# Patient Record
(demographics unavailable — no encounter records)

---

## 2025-03-17 NOTE — IMAGING
[de-identified] : On examination of his right shoulder he has no erythema, no swelling, no ecchymosis.  He has some mild tenderness over the AC joint and the anterior glenohumeral joint.  No tenderness over the clavicle.  He has full range of motion of the shoulder.  Negative speeds, negative drop arm mild pain with McDonough's.  Pain with apprehension and [place].  He has 5-5 strength, sensation is intact throughout, 2+ radial pulse.  X-rays taken in the office today of the right shoulder show no obvious fractures, dislocations, or other bony abnormalities.

## 2025-03-17 NOTE — HISTORY OF PRESENT ILLNESS
[de-identified] : 16-year-old male is here with his parents for evaluation of his right shoulder.  Patient states on Saturday he was playing rugby, he went to tackle someone and felt pain in his right shoulder.  He states the pain has improved a lot since Saturday but he still has some pain.  He has been resting and icing.  They did not have any x-rays or evaluation prior to coming into the office today.  He denies any pain in the elbow wrist or hand.  He denies any numbness or tingling.

## 2025-03-17 NOTE — DISCUSSION/SUMMARY
[de-identified] : At this time he is going to continue to rest and ice the shoulder, ibuprofen as needed for pain.  I gave him a prescription for physical therapy.  We will submit for MRI of the shoulder.  He is in a state out of rugby for the next 1 to 2 weeks or until he is pain-free.  Will schedule him a follow-up for repeat evaluation. Patient's parent will call me if any other problems or concerns.  They verbalized understanding and agreed with the plan, all questions were answered in the office today.

## 2025-05-06 NOTE — ASSESSMENT
[FreeTextEntry1] : Emphasized the importance of going to PT and doing the exercises at home. Follow up in 4 weeks.

## 2025-05-06 NOTE — HISTORY OF PRESENT ILLNESS
[FreeTextEntry1] : 17 y/o male here with right shoulder injury playing rugby. Last seen by PARVIZ Torrez who ordered MRI. MRI shows bankart tear of the right shoulder

## 2025-06-24 NOTE — HISTORY OF PRESENT ILLNESS
[FreeTextEntry1] : 16 y/o male here today with Bankart tear of the right shoulder on MRI. Patient has been doing PT for the past 4 weeks.

## 2025-06-24 NOTE — ASSESSMENT
[FreeTextEntry1] : no issues today. Patient is to continue physical therapy, learn exercises, and do them at home. Stretch before and after playing sports. Emphasized good tackling form for Rugby to prevent future dislocation of the shoulder. Patient will follow up PRN.